# Patient Record
Sex: MALE | Race: WHITE | NOT HISPANIC OR LATINO | URBAN - METROPOLITAN AREA
[De-identification: names, ages, dates, MRNs, and addresses within clinical notes are randomized per-mention and may not be internally consistent; named-entity substitution may affect disease eponyms.]

---

## 2020-08-18 ENCOUNTER — OFFICE VISIT - HEALTHEAST (OUTPATIENT)
Dept: FAMILY MEDICINE | Facility: CLINIC | Age: 40
End: 2020-08-18

## 2020-08-18 DIAGNOSIS — H90.6 MIXED HEARING LOSS, BILATERAL: ICD-10-CM

## 2020-08-18 ASSESSMENT — MIFFLIN-ST. JEOR: SCORE: 2372.03

## 2020-09-08 ENCOUNTER — OFFICE VISIT - HEALTHEAST (OUTPATIENT)
Dept: AUDIOLOGY | Facility: CLINIC | Age: 40
End: 2020-09-08

## 2020-09-08 DIAGNOSIS — Z77.122 HISTORY OF EXPOSURE TO NOISE: ICD-10-CM

## 2020-09-08 DIAGNOSIS — Z71.1 PERSON WITH FEARED COMPLAINT IN WHOM NO DIAGNOSIS WAS MADE: ICD-10-CM

## 2020-11-30 ENCOUNTER — COMMUNICATION - HEALTHEAST (OUTPATIENT)
Dept: FAMILY MEDICINE | Facility: CLINIC | Age: 40
End: 2020-11-30

## 2020-12-01 ENCOUNTER — OFFICE VISIT - HEALTHEAST (OUTPATIENT)
Dept: FAMILY MEDICINE | Facility: CLINIC | Age: 40
End: 2020-12-01

## 2020-12-01 DIAGNOSIS — U07.1 INFECTION DUE TO 2019 NOVEL CORONAVIRUS: ICD-10-CM

## 2020-12-01 DIAGNOSIS — E66.01 MORBID OBESITY (H): ICD-10-CM

## 2021-06-04 VITALS
OXYGEN SATURATION: 98 % | BODY MASS INDEX: 39.82 KG/M2 | DIASTOLIC BLOOD PRESSURE: 84 MMHG | HEIGHT: 74 IN | WEIGHT: 310.25 LBS | SYSTOLIC BLOOD PRESSURE: 124 MMHG | HEART RATE: 94 BPM | RESPIRATION RATE: 16 BRPM

## 2021-06-05 VITALS
OXYGEN SATURATION: 98 % | DIASTOLIC BLOOD PRESSURE: 84 MMHG | BODY MASS INDEX: 40.7 KG/M2 | RESPIRATION RATE: 16 BRPM | TEMPERATURE: 97.7 F | WEIGHT: 315 LBS | SYSTOLIC BLOOD PRESSURE: 120 MMHG | HEART RATE: 87 BPM

## 2021-06-10 NOTE — PROGRESS NOTES
"S:  Patient presents with concerns about hearing loss.  He states that he would like a \"patient advocate\" to give him an objective opinion concerning his medical issues.  He currently seeks care at the [facility].  He tells me that he has history of hearing loss, sleep apnea, and probable COVID 19 infection in April.  Medical records are currently unavailable.  Review of visit from 4/1/2020 shows that he had atypical chest pain with normal EKG and negative troponin and negative d-dimer.    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    O:   Blood pressure 124/84 pulse 94 respirations 16  Alert conversant in no acute distress  Psych-return through the good memories and judgment appears somewhat nervous    A:   Hearing loss  Sleep apnea    P:   Given his past history of jet engine exposure it is highly likely that he has hearing loss particularly in the high frequencies.  The patient would like to verify these losses and audiology referral placed.  [facility] stated [facility] will [facility] issues that he is facing and follow-up with me if he has questions.  Total time spent 20 minutes  "

## 2021-06-13 NOTE — TELEPHONE ENCOUNTER
Left message to call back for: pt  Information to relay to patient:  Left detailed message stating pt needs to be seen in clinic per Dr. Kimball. I asked him if he could come in at 7am.

## 2021-06-13 NOTE — PROGRESS NOTES
Patient with COVID-19 diagnosis confirmed in April of this year.  Recently exposed at work to somebody with COVID-19 approximately 2 weeks ago and had a negative COVID-19 test.  He would like to return to work.  He is asymptomatic.  We discussed this for 15 minutes in the office face-to-face and I gave him counseling.  Note for return to work given.

## 2021-06-16 PROBLEM — E66.01 MORBID OBESITY (H): Status: ACTIVE | Noted: 2020-12-01

## 2021-06-29 NOTE — PROGRESS NOTES
"Progress Notes by Ximena Huffman AuD at 9/8/2020  8:00 AM     Author: Ximena Huffman AuD Service: -- Author Type: Audiologist    Filed: 9/8/2020  9:01 AM Encounter Date: 9/8/2020 Status: Signed    : Ximena Huffman AuD (Audiologist)       Audiology only; referred by Nemesio Kimball    Summary:  Audiology visit completed. Please see audiogram below or under \"media\" tab for history and results.    Transducer:  Both insert phones and circumaural headphones were used.    Reliability:    Good    Recommendations:  Follow-up with PCP; retest hearing per medical management or patient concern.  Wear hearing protection consistently in noise to preserve current hearing sensitivity and to minimize the effects of tinnitus.  Kenneth Xavier has normal hearing sensitivity in both ears, and is not an amplification candidate at this time for either ear. However, based on the description of his difficulties and extensive history of noise exposure through the , auditory processing testing may be indicated. This [facility], but could also be obtained through the [facility] through the Department of Communication Disorders (contact: Lenore Elizabeth, Ph.D., [place], [phone]). Mr. Xavier expressed verbal understanding of this information and plan.      Gillian Echevarria, [facility]  [place] Licensed Audiologist [id number]           "

## 2021-07-24 ENCOUNTER — HEALTH MAINTENANCE LETTER (OUTPATIENT)
Age: 41
End: 2021-07-24

## 2021-09-18 ENCOUNTER — HEALTH MAINTENANCE LETTER (OUTPATIENT)
Age: 41
End: 2021-09-18

## 2022-08-20 ENCOUNTER — HEALTH MAINTENANCE LETTER (OUTPATIENT)
Age: 42
End: 2022-08-20

## 2022-11-20 ENCOUNTER — HEALTH MAINTENANCE LETTER (OUTPATIENT)
Age: 42
End: 2022-11-20

## 2023-09-10 ENCOUNTER — HEALTH MAINTENANCE LETTER (OUTPATIENT)
Age: 43
End: 2023-09-10